# Patient Record
Sex: FEMALE | Race: WHITE | NOT HISPANIC OR LATINO | Employment: UNEMPLOYED | URBAN - METROPOLITAN AREA
[De-identification: names, ages, dates, MRNs, and addresses within clinical notes are randomized per-mention and may not be internally consistent; named-entity substitution may affect disease eponyms.]

---

## 2017-01-10 ENCOUNTER — TELEPHONE (OUTPATIENT)
Dept: FAMILY MEDICINE CLINIC | Facility: CLINIC | Age: 32
End: 2017-01-10

## 2017-01-10 NOTE — TELEPHONE ENCOUNTER
----- Message from Neva Hood sent at 1/10/2017 10:39 AM EST -----  Regarding: RESULTS  Contact: [phone]  PATIENT HAD LABS DONE BUT HAD TO BE SENT TO THE HOSPITAL TO GET THEM DONE, WOULD LIKE TO FIND OUT IF SHE COULD GET A CALL WITH THOSE RESULTS    Advised pt that test results were negative for Zika virus.  BBbreana, CMA

## 2017-01-12 ENCOUNTER — OFFICE VISIT (OUTPATIENT)
Dept: FAMILY MEDICINE CLINIC | Facility: CLINIC | Age: 32
End: 2017-01-12

## 2017-01-12 VITALS
SYSTOLIC BLOOD PRESSURE: 100 MMHG | RESPIRATION RATE: 16 BRPM | WEIGHT: 182 LBS | DIASTOLIC BLOOD PRESSURE: 70 MMHG | BODY MASS INDEX: 24.01 KG/M2 | HEART RATE: 108 BPM | TEMPERATURE: 98.3 F

## 2017-01-12 DIAGNOSIS — M54.50 ACUTE RIGHT-SIDED LOW BACK PAIN WITHOUT SCIATICA: Primary | ICD-10-CM

## 2017-01-12 PROCEDURE — 99213 OFFICE O/P EST LOW 20 MIN: CPT | Performed by: FAMILY MEDICINE

## 2017-01-12 RX ORDER — PREDNISONE 10 MG/1
TABLET ORAL
Qty: 48 TABLET | Refills: 0 | Status: SHIPPED | OUTPATIENT
Start: 2017-01-12 | End: 2017-04-26

## 2017-01-12 RX ORDER — TRAMADOL HYDROCHLORIDE 50 MG/1
TABLET ORAL
COMMUNITY
Start: 2017-01-09 | End: 2017-04-26

## 2017-01-12 RX ORDER — CYCLOBENZAPRINE HCL 10 MG
TABLET ORAL
COMMUNITY
Start: 2017-01-09 | End: 2017-01-12 | Stop reason: ALTCHOICE

## 2017-01-12 RX ORDER — METHOCARBAMOL 500 MG/1
500 TABLET, FILM COATED ORAL 3 TIMES DAILY
Qty: 21 TABLET | Refills: 0 | Status: SHIPPED | OUTPATIENT
Start: 2017-01-12 | End: 2017-04-26

## 2017-01-12 NOTE — PROGRESS NOTES
Subjective   Zeina Burns is a 31 y.o. female.     History of Present Illness   Three days of acute low back pain could not move without assistance.  [facility]en [facility], X ray neg.   Bilateral low pack discomfort no radiation to legs.  Worse to sit. No bowel or kidney problem.  Given Tramadol and muscle relaxer (Flexeril 10 mg HS).  Difficult to sleep. Lumbar disc surgery 2008.  The following portions of the patient's history were reviewed and updated as appropriate: allergies, current medications, past family history, past medical history, past social history, past surgical history and problem list.    Review of Systems   Constitutional: Negative.    HENT: Negative.    Eyes: Negative.    Respiratory: Negative.    Cardiovascular: Negative.    Gastrointestinal: Negative.    Endocrine: Negative.    Musculoskeletal: Positive for back pain.   Skin: Negative.        Objective   Physical Exam   Constitutional: She appears well-developed. She appears distressed.   Pulmonary/Chest: Effort normal.   Musculoskeletal:        Lumbar back: She exhibits decreased range of motion and tenderness.   Leg lift positive right side.    Neurological: She is alert.   Vitals reviewed.      Assessment/Plan   Zeina was seen today for back pain.    Diagnoses and all orders for this visit:    Acute right-sided low back pain without sciatica  -     methocarbamol (ROBAXIN) 500 MG tablet; Take 1 tablet by mouth 3 (Three) Times a Day.  -     PredniSONE (DELTASONE) 10 MG (48) tablet pack; As directed

## 2017-01-12 NOTE — MR AVS SNAPSHOT
Zeina Burns   1/12/2017 2:00 PM   Office Visit    Provider:  Real Ibarra MD   Department:  [facility]   Dept Phone:  [phone]                Your Full Care Plan              Today's Medication Changes          These changes are accurate as of: 1/12/17  2:35 PM.  If you have any questions, ask your nurse or doctor.               New Medication(s)Ordered:     methocarbamol 500 MG tablet   Commonly known as:  ROBAXIN   Take 1 tablet by mouth 3 (Three) Times a Day.   Started by:  Real Ibarra MD       PredniSONE 10 MG (48) tablet pack   Commonly known as:  DELTASONE   As directed   Started by:  Real Ibarra MD         Stop taking medication(s)listed here:     cyclobenzaprine 10 MG tablet   Commonly known as:  FLEXERIL   Stopped by:  Real Ibarra MD                Where to Get Your Medications      [facility] [place] [address] [address] [facility] [address] [address] [facility] [phone] [facility] [phone] [address] [address]     Phone:  [phone]     methocarbamol 500 MG tablet    PredniSONE 10 MG (48) tablet pack                  Your Updated Medication List          This list is accurate as of: 1/12/17  2:35 PM.  Always use your most recent med list.                loratadine 10 MG tablet   Commonly known as:  CLARITIN       methocarbamol 500 MG tablet   Commonly known as:  ROBAXIN   Take 1 tablet by mouth 3 (Three) Times a Day.       PredniSONE 10 MG (48) tablet pack   Commonly known as:  DELTASONE   As directed       SPRINTEC 28 0.25-35 MG-MCG per tablet   Generic drug:  norgestimate-ethinyl estradiol       traMADol 50 MG tablet   Commonly known as:  ULTRAM               You Were Diagnosed With        Codes Comments    Acute right-sided low back pain without sciatica    -  Primary ICD-10-CM: M54.5  ICD-9-CM: 724.2       Instructions      None    Patient Instructions History      MyChart Signup     [facility] allows you to send messages to your doctor, view your test results, renew your prescriptions, schedule appointments, and more. To sign up, go to [employer] and click on the Sign Up Now link in the New User? box. Enter your [employer] Activation Code exactly as it appears below along with the last four digits of your Social Security Number and your Date of Birth () to complete the sign-up process. If you do not sign up before the expiration date, you must request a new code.    Ventive Activation Code: [email or web address]  Expires: 2017  2:35 PM    If you have questions, you can email [email or web address] or call [phone] to talk to our Vent[employer] staff. Remember, Ventive is NOT to be used for urgent needs. For medical emergencies, dial 911.               Other Info from Your Visit           Allergies     Morphine        Reason for Visit     Back Pain           Vital Signs     Blood Pressure Pulse Temperature Respirations Weight Body Mass Index    100/70 108 98.3 °F (36.8 °C) 16 182 lb (82.6 kg) 24.01 kg/m2    Smoking Status                   Never Smoker           Problems and Diagnoses Noted     Acute right-sided low back pain without sciatica    -  Primary

## 2017-04-26 ENCOUNTER — OFFICE VISIT (OUTPATIENT)
Dept: FAMILY MEDICINE CLINIC | Facility: CLINIC | Age: 32
End: 2017-04-26

## 2017-04-26 VITALS
HEIGHT: 72 IN | BODY MASS INDEX: 23.98 KG/M2 | DIASTOLIC BLOOD PRESSURE: 68 MMHG | WEIGHT: 177 LBS | HEART RATE: 99 BPM | SYSTOLIC BLOOD PRESSURE: 98 MMHG | TEMPERATURE: 98.5 F | OXYGEN SATURATION: 97 %

## 2017-04-26 DIAGNOSIS — J01.80 OTHER ACUTE SINUSITIS, RECURRENCE NOT SPECIFIED: Primary | ICD-10-CM

## 2017-04-26 PROCEDURE — 99213 OFFICE O/P EST LOW 20 MIN: CPT | Performed by: NURSE PRACTITIONER

## 2017-04-26 RX ORDER — DEXTROMETHORPHAN HYDROBROMIDE AND PROMETHAZINE HYDROCHLORIDE 15; 6.25 MG/5ML; MG/5ML
5 SYRUP ORAL 4 TIMES DAILY PRN
Qty: 240 ML | Refills: 0 | OUTPATIENT
Start: 2017-04-26 | End: 2020-07-16

## 2017-04-26 RX ORDER — AZITHROMYCIN 250 MG/1
TABLET, FILM COATED ORAL
Qty: 6 TABLET | Refills: 0 | OUTPATIENT
Start: 2017-04-26 | End: 2020-07-16

## 2017-04-26 NOTE — PROGRESS NOTES
Subjective   Zeina Burns is a 31 y.o. female.     History of Present Illness She has 2 day history of sinus pressure, headache, fever, myalgia, cough productive of yellow sputum.  Son with bronchitis and friends with pneumonia.  She has nasal discharge and congestion. She had a flu vacc this year. Is hoarse with post nasal drainage.    The following portions of the patient's history were reviewed and updated as appropriate: allergies, current medications, past family history, past medical history, past social history, past surgical history and problem list.    Review of Systems   Constitutional: Positive for fatigue and fever. Negative for unexpected weight change.   HENT: Positive for congestion, rhinorrhea, sinus pressure and sore throat. Negative for hearing loss, nosebleeds, trouble swallowing and voice change.    Eyes: Negative for pain, discharge, redness and visual disturbance.   Respiratory: Positive for cough and shortness of breath. Negative for chest tightness and wheezing.    Cardiovascular: Negative for chest pain, palpitations and leg swelling.   Gastrointestinal: Negative for abdominal distention, abdominal pain, anal bleeding, blood in stool, constipation, diarrhea, nausea and vomiting.   Endocrine: Negative for cold intolerance, heat intolerance, polydipsia, polyphagia and polyuria.   Genitourinary: Negative for dysuria, flank pain, frequency and hematuria.   Musculoskeletal: Negative for arthralgias, gait problem, joint swelling and myalgias.   Skin: Negative for color change and rash.   Neurological: Negative for dizziness, tremors, seizures, syncope, speech difficulty, weakness, numbness and headaches.   Hematological: Negative.    Psychiatric/Behavioral: Negative.        Objective   Physical Exam   Constitutional: She is oriented to person, place, and time. She appears well-developed and well-nourished. No distress.   HENT:   Head: Normocephalic and atraumatic.   Right Ear: Tympanic membrane  and external ear normal.   Left Ear: Tympanic membrane and external ear normal.   Nose: Right sinus exhibits maxillary sinus tenderness and frontal sinus tenderness. Left sinus exhibits maxillary sinus tenderness and frontal sinus tenderness.   Mouth/Throat: Oropharynx is clear and moist. No oropharyngeal exudate.   Eyes: Conjunctivae are normal. Pupils are equal, round, and reactive to light. Right eye exhibits no discharge. Left eye exhibits no discharge. No scleral icterus.   Neck: Neck supple. No tracheal deviation present. No thyromegaly present.   Cardiovascular: Normal rate, regular rhythm and normal heart sounds.  Exam reveals no gallop and no friction rub.    No murmur heard.  Pulmonary/Chest: Effort normal and breath sounds normal. No respiratory distress. She has no wheezes.   Abdominal: Soft. Bowel sounds are normal. She exhibits no distension and no mass. There is no tenderness.   Musculoskeletal: She exhibits no edema or deformity.   Lymphadenopathy:     She has no cervical adenopathy.   Neurological: She is alert and oriented to person, place, and time. Coordination normal.   Skin: Skin is warm and dry. No rash noted. No erythema.   Psychiatric: She has a normal mood and affect. Her speech is normal and behavior is normal. Judgment and thought content normal.   Nursing note and vitals reviewed.    Zeina was seen today for uri.    Diagnoses and all orders for this visit:    Other acute sinusitis, recurrence not specified  -     azithromycin (ZITHROMAX Z-ANNETTA) 250 MG tablet; Take 2 tablets the first day, then 1 tablet daily for 4 days.  -     Chlorcyclizine-Pseudoephed 25-60 MG tablet; Take 25 mg by mouth 2 (Two) Times a Day.  -     promethazine-dextromethorphan (PROMETHAZINE-DM) 6.25-15 MG/5ML syrup; Take 5 mL by mouth 4 (Four) Times a Day As Needed for Cough.           Increase fluids and rest. Treat symptoms with Tylenol or Ibuprofen. Follow up for chest pain, worsening cough, SOB, wheezing, or  decreased urine output. Stay away from those with compromised immune system. Recommend flu vaccination next year.

## 2017-04-27 ENCOUNTER — TELEPHONE (OUTPATIENT)
Dept: FAMILY MEDICINE CLINIC | Facility: CLINIC | Age: 32
End: 2017-04-27

## 2017-04-27 NOTE — TELEPHONE ENCOUNTER
Message left for pt to use the OTC med.  ----- Message from Awa Arora DNP, APRN sent at 4/26/2017  5:22 PM EDT -----  Regarding: RE: CHANGE MED   She just needs to use OTC Robitussin DM  ----- Message -----     From: Laila Crespo MA     Sent: 4/26/2017  12:39 PM       To: Awa Arora DNP, APRN  Subject: FW: CHANGE MED                                       ----- Message -----     From: Patty Collazo     Sent: 4/26/2017  12:25 PM       To: Laila Crespo MA  Subject: CHANGE MED                                       PROMETHAZINE DM IS ON BACK ORDER PHARMACY WOULD LIKE SOMETHING ELSE CALLED IN.

## 2020-07-16 PROCEDURE — U0003 INFECTIOUS AGENT DETECTION BY NUCLEIC ACID (DNA OR RNA); SEVERE ACUTE RESPIRATORY SYNDROME CORONAVIRUS 2 (SARS-COV-2) (CORONAVIRUS DISEASE [COVID-19]), AMPLIFIED PROBE TECHNIQUE, MAKING USE OF HIGH THROUGHPUT TECHNOLOGIES AS DESCRIBED BY CMS-2020-01-R: HCPCS | Performed by: NURSE PRACTITIONER

## 2020-07-18 ENCOUNTER — TELEPHONE (OUTPATIENT)
Dept: URGENT CARE | Facility: CLINIC | Age: 35
End: 2020-07-18

## 2021-03-15 ENCOUNTER — IMMUNIZATION (OUTPATIENT)
Dept: VACCINE CLINIC | Facility: HOSPITAL | Age: 36
End: 2021-03-15

## 2021-03-15 PROCEDURE — 91300 HC SARSCOV02 VAC 30MCG/0.3ML IM: CPT | Performed by: INTERNAL MEDICINE

## 2021-03-15 PROCEDURE — 0001A: CPT | Performed by: INTERNAL MEDICINE

## 2021-04-05 ENCOUNTER — IMMUNIZATION (OUTPATIENT)
Dept: VACCINE CLINIC | Facility: HOSPITAL | Age: 36
End: 2021-04-05

## 2021-04-05 PROCEDURE — 0002A: CPT | Performed by: INTERNAL MEDICINE

## 2021-04-05 PROCEDURE — 91300 HC SARSCOV02 VAC 30MCG/0.3ML IM: CPT | Performed by: INTERNAL MEDICINE
